# Patient Record
(demographics unavailable — no encounter records)

---

## 2025-04-10 NOTE — HISTORY OF PRESENT ILLNESS
[Former] : Former [TextBox_13] : Ms. HOLLIDAY is a 61 year old female.   She was called to review eligibility for Low-Dose CT lung cancer screening.  Reviewed and confirmed that the patient meets screening eligibility criteria:   61 years old   Smoking Status: Former smoker  Number of pack years smokin  Quit year:    No symptoms of lung cancer, including new cough, change in cough, hemoptysis, and unintentional weight loss.   No personal history of lung cancer.  No lung cancer in a first degree relative.  No history of lung disease or occupational exposures. [YearQuit] : 2025 [PacksperYear] : 20